# Patient Record
Sex: FEMALE | Race: WHITE | NOT HISPANIC OR LATINO
[De-identification: names, ages, dates, MRNs, and addresses within clinical notes are randomized per-mention and may not be internally consistent; named-entity substitution may affect disease eponyms.]

---

## 2018-04-13 ENCOUNTER — APPOINTMENT (OUTPATIENT)
Dept: ULTRASOUND IMAGING | Facility: IMAGING CENTER | Age: 58
End: 2018-04-13
Payer: COMMERCIAL

## 2018-04-13 ENCOUNTER — APPOINTMENT (OUTPATIENT)
Dept: MAMMOGRAPHY | Facility: IMAGING CENTER | Age: 58
End: 2018-04-13
Payer: COMMERCIAL

## 2018-04-13 ENCOUNTER — OUTPATIENT (OUTPATIENT)
Dept: OUTPATIENT SERVICES | Facility: HOSPITAL | Age: 58
LOS: 1 days | End: 2018-04-13
Payer: COMMERCIAL

## 2018-04-13 DIAGNOSIS — Z00.8 ENCOUNTER FOR OTHER GENERAL EXAMINATION: ICD-10-CM

## 2018-04-13 PROCEDURE — 76641 ULTRASOUND BREAST COMPLETE: CPT

## 2018-04-13 PROCEDURE — 77067 SCR MAMMO BI INCL CAD: CPT | Mod: 26

## 2018-04-13 PROCEDURE — 76641 ULTRASOUND BREAST COMPLETE: CPT | Mod: 26,50

## 2018-04-13 PROCEDURE — 77067 SCR MAMMO BI INCL CAD: CPT

## 2018-04-13 PROCEDURE — 77063 BREAST TOMOSYNTHESIS BI: CPT | Mod: 26

## 2018-04-13 PROCEDURE — 77063 BREAST TOMOSYNTHESIS BI: CPT

## 2019-08-07 ENCOUNTER — APPOINTMENT (OUTPATIENT)
Dept: MAMMOGRAPHY | Facility: IMAGING CENTER | Age: 59
End: 2019-08-07

## 2019-08-07 ENCOUNTER — APPOINTMENT (OUTPATIENT)
Dept: ULTRASOUND IMAGING | Facility: IMAGING CENTER | Age: 59
End: 2019-08-07

## 2021-10-30 ENCOUNTER — EMERGENCY (EMERGENCY)
Facility: HOSPITAL | Age: 61
LOS: 1 days | Discharge: HOME | End: 2021-10-30
Attending: EMERGENCY MEDICINE
Payer: COMMERCIAL

## 2021-10-30 VITALS
RESPIRATION RATE: 19 BRPM | SYSTOLIC BLOOD PRESSURE: 135 MMHG | DIASTOLIC BLOOD PRESSURE: 78 MMHG | HEART RATE: 65 BPM | OXYGEN SATURATION: 99 % | TEMPERATURE: 98 F

## 2021-10-30 VITALS
HEIGHT: 65 IN | HEART RATE: 77 BPM | DIASTOLIC BLOOD PRESSURE: 85 MMHG | RESPIRATION RATE: 20 BRPM | OXYGEN SATURATION: 100 % | TEMPERATURE: 98 F | WEIGHT: 175.05 LBS | SYSTOLIC BLOOD PRESSURE: 148 MMHG

## 2021-10-30 PROCEDURE — 73110 X-RAY EXAM OF WRIST: CPT | Mod: 26,LT

## 2021-10-30 PROCEDURE — 73090 X-RAY EXAM OF FOREARM: CPT | Mod: 26,LT

## 2021-10-30 PROCEDURE — 73090 X-RAY EXAM OF FOREARM: CPT

## 2021-10-30 PROCEDURE — 99285 EMERGENCY DEPT VISIT HI MDM: CPT | Mod: 25

## 2021-10-30 PROCEDURE — 73100 X-RAY EXAM OF WRIST: CPT

## 2021-10-30 PROCEDURE — 99284 EMERGENCY DEPT VISIT MOD MDM: CPT

## 2021-10-30 PROCEDURE — 73100 X-RAY EXAM OF WRIST: CPT | Mod: 26,59,LT

## 2021-10-30 PROCEDURE — 25605 CLTX DST RDL FX/EPHYS SEP W/: CPT | Mod: LT

## 2021-10-30 PROCEDURE — 96372 THER/PROPH/DIAG INJ SC/IM: CPT | Mod: XU

## 2021-10-30 PROCEDURE — 73110 X-RAY EXAM OF WRIST: CPT

## 2021-10-30 RX ORDER — OXYCODONE HYDROCHLORIDE 5 MG/1
5 TABLET ORAL ONCE
Refills: 0 | Status: DISCONTINUED | OUTPATIENT
Start: 2021-10-30 | End: 2021-10-30

## 2021-10-30 RX ORDER — KETOROLAC TROMETHAMINE 30 MG/ML
15 SYRINGE (ML) INJECTION ONCE
Refills: 0 | Status: DISCONTINUED | OUTPATIENT
Start: 2021-10-30 | End: 2021-10-30

## 2021-10-30 RX ORDER — KETOROLAC TROMETHAMINE 30 MG/ML
1 SYRINGE (ML) INJECTION
Qty: 15 | Refills: 0
Start: 2021-10-30 | End: 2021-11-03

## 2021-10-30 RX ORDER — ACETAMINOPHEN 500 MG
975 TABLET ORAL ONCE
Refills: 0 | Status: COMPLETED | OUTPATIENT
Start: 2021-10-30 | End: 2021-10-30

## 2021-10-30 RX ADMIN — Medication 975 MILLIGRAM(S): at 13:02

## 2021-10-30 RX ADMIN — Medication 975 MILLIGRAM(S): at 14:19

## 2021-10-30 RX ADMIN — Medication 15 MILLIGRAM(S): at 14:50

## 2021-10-30 NOTE — ED ADULT NURSE NOTE - NSIMPLEMENTINTERV_GEN_ALL_ED
Implemented All Fall Risk Interventions:  [place] to call system. Call bell, personal items and telephone within reach. Instruct patient to call for assistance. Room bathroom lighting operational. Non-slip footwear when patient is off stretcher. Physically safe environment: no spills, clutter or unnecessary equipment. Stretcher in lowest position, wheels locked, appropriate side rails in place. Provide visual cue, wrist band, yellow gown, etc. Monitor gait and stability. Monitor for mental status changes and reorient to person, place, and time. Review medications for side effects contributing to fall risk. Reinforce activity limits and safety measures with patient and family.

## 2021-10-30 NOTE — ED PROVIDER NOTE - NSFOLLOWUPINSTRUCTIONS_ED_ALL_ED_FT
YOU WERE SEEN IN THE ED FOR: arm fracture    YOUR BROKEN BONE WAS SPLINTED.    YOU WERE PRESCRIBED: toradol (do NOT take motrin/advil/other ibuprofen with this)  FOLLOW THE INSTRUCTIONS ON THE LABEL/CONTAINER    FOR PAIN/FEVER, YOU MAY TAKE TYLENOL (acetaminophen). FOLLOW THE INSTRUCTIONS ON THE LABEL/CONTAINER.    PLEASE FOLLOW UP WITH YOUR PRIVATE PHYSICIAN WITHIN THE NEXT 48 HOURS. BRING COPIES OF YOUR RESULTS.    PLEASE FOLLOW UP WITH ORTHOPEDICS WITHIN 1 WEEK. INFORMATION TO CALL AND MAKE AN APPOINTMENT IS PROVIDED.     RETURN TO THE EMERGENCY DEPARTMENT IF YOU EXPERIENCE ANY NEW/CONCERNING/WORSENING SYMPTOMS.    PLEASE SEE ATTACHED ON COMPARTMENT SYNDROME.

## 2021-10-30 NOTE — ED ADULT NURSE NOTE - OBJECTIVE STATEMENT
61 year old female presents to ED c/o L wrist pain s/p mech fall yesterday with a notable deformity and swelling. (+) radial pulses and patient only able to mobilize 3, 4 and 5th digit. 1st and 2nd digit to L hand is painful to movement. no other complaints or deformities.

## 2021-10-30 NOTE — ED PROVIDER NOTE - PHYSICAL EXAMINATION
GENERAL: middle aged female, sitting in bed, NAD. Vital signs are within normal limits  EYES: Conjunctiva noninjected or pale, sclera anicteric  HENT: NC/AT, moist mucous membranes  NECK: Supple, trachea midline  LUNG: Nonlabored respirations, no wheezes, rales  CV: RRR, Pulses- Radial/dorsalis pedis: 2+ bilateral and equal EXCEPT 1+ left radius (2/2 swelling). 2+ brachial pulse equal, bilateral.  ABDOMEN: Nondistended, nontender  MSK: + volar angulated left forearm deformity, left wrist tenderness. Able to wiggle fingers. Full range of motion at left forearm and shoulder. Unable to flex/extend wrist  SKIN: + left wrist swelling. No rashes  NEURO: AAOx4 (to person, place, time, event), no tremor, steady gait, sensation intact to touch bilateral UE. intact radius/median/ulnar nerve fxn  PSYCH: Normal mood and affect

## 2021-10-30 NOTE — CONSULT NOTE ADULT - SUBJECTIVE AND OBJECTIVE BOX
HPI: 61y year old Female RHD  s/p mechanical fall.  Landed on outstretched R hand after falling over chair.  Patient had deformity and pain in left wrist.  Patient denies pain in any other joint, numbness, tingling, paresthesias. No pain in any other extremities.  No Head trauma or LOC.    PMH:  Asthma    2019 novel coronavirus disease (COVID-19)    COVID-19 vaccine series completed      [ ] No past medical history    PSH:  No significant past surgical history      [ ] No past surgical history    Allergies:  oxycodone (Unknown)      O:  T(C): 36.9 (10-30-21 @ 16:46), Max: 36.9 (10-30-21 @ 16:46)  HR: 65 (10-30-21 @ 16:46) (65 - 77)  BP: 135/78 (10-30-21 @ 16:46) (135/78 - 148/85)  RR: 19 (10-30-21 @ 16:46) (19 - 20)  SpO2: 99% (10-30-21 @ 16:46) (99% - 100%)    Gen  LUE  AIN/PIN/U Intact  SILT M/U/R  Radial pulse palpable, WWP distally  Skin intact  Swollen wrist    Imaging:    Patient underwent hematoma block.  Injected with 10 cc of 1% lidocaine without epinephrine into hematoma.  Hung in traction and reduced. Patient placed in sugartong splint.  Post reduction x-rays reviewed in improved alignment.  A/P 61y year old man with left/right distal radius fracture  Pain Control  HALLIE Graying for comfort  F/u as outpatient in 1 week with Francis      HPI: 61y year old Female RHD  s/p mechanical fall.  Landed on outstretched R hand after falling over chair.  Patient had deformity and pain in left wrist.  Patient denies pain in any other joint, numbness, tingling, paresthesias. No pain in any other extremities.  No Head trauma or LOC.    PMH:  Asthma    2019 novel coronavirus disease (COVID-19)    COVID-19 vaccine series completed      [ ] No past medical history    PSH:  No significant past surgical history      [ ] No past surgical history    Allergies:  oxycodone (Unknown)      O:  T(C): 36.9 (10-30-21 @ 16:46), Max: 36.9 (10-30-21 @ 16:46)  HR: 65 (10-30-21 @ 16:46) (65 - 77)  BP: 135/78 (10-30-21 @ 16:46) (135/78 - 148/85)  RR: 19 (10-30-21 @ 16:46) (19 - 20)  SpO2: 99% (10-30-21 @ 16:46) (99% - 100%)    Gen  LUE  AIN/PIN/U Intact  SILT M/U/R  Radial pulse palpable, WWP distally  Skin intact  Swollen wrist    Secondary:  No TTP over bony landmarks, SILT BL, ROM intact BL, distal pulses palpable.      Imaging:    Patient underwent hematoma block.  Injected with 10 cc of 1% lidocaine without epinephrine into hematoma.  Hung in traction and reduced. Patient placed in sugartong splint.  Post reduction x-rays reviewed in improved alignment.  A/P 61y year old man with left/right distal radius fracture  Pain Control  NWTELMA BURTONE  Sling for comfort  F/u as outpatient in 1 week with Francis

## 2021-10-30 NOTE — ED PROVIDER NOTE - PATIENT PORTAL LINK FT
You can access the FollowMyHealth Patient Portal offered by [facility] by registering at the following website: [email or web address]. By joining [employer]’s FollowMyHealth portal, you will also be able to view your health information using other applications (apps) compatible with our system.

## 2021-10-30 NOTE — ED PROVIDER NOTE - NSICDXPASTMEDICALHX_GEN_ALL_CORE_FT
PAST MEDICAL HISTORY:  2019 novel coronavirus disease (COVID-19)     Asthma     COVID-19 vaccine series completed

## 2021-10-30 NOTE — ED PROVIDER NOTE - CARE PROVIDER_API CALL
Nick Blanco)  Orthopedics  [address] [place] [address]  Phone: [phone]  Fax: [phone]  Follow Up Time: 4-6 Days

## 2021-10-30 NOTE — ED PROVIDER NOTE - CLINICAL SUMMARY MEDICAL DECISION MAKING FREE TEXT BOX
61F, R handed, here with left wrist deformity. neurovac intact. No other signs of traumatic injury. Eval fx vs dislocation. Xrays, analgesia

## 2021-10-30 NOTE — ED PROVIDER NOTE - OBJECTIVE STATEMENT
61F right handed presents to the ED s/p fall off ottkathrin while trying to reach for object high up. Fell onto left hand. + pain, swelling. Able to wiggle fingers. Applied ice. Did not take any analgesia. Denies ac/thinners, LOC, headache, visual changes.

## 2021-10-30 NOTE — ED PROVIDER NOTE - ATTENDING CONTRIBUTION TO CARE
61y female pmh Asthma Covid19 infection, Now fully covid vaccination. Pt comes to ed c/o pain left wrist sp trip and fall backwards. FOOSH, PE WDWN female mild distress heent normocephalic atraumatic neck supple chest clear anterior & posterior cv no rubs, gallops or murmurs abd soft +bs, Neuro no focal defects. MSK salad fork deformity left wrists w ttp   Danny Callaway MD, Facep

## 2021-10-30 NOTE — ED PROVIDER NOTE - PROGRESS NOTE DETAILS
Kurt Singh D.O., PGY3 (Resident)   Patient declined oxycodone. Ice applied to wrist. Elevated Endorsed to Dr Alexsandra Callaway MD, Facep Kurt Singh D.O., PGY3 (Resident)  Discussed /w ortho. Patient splinted. Stable for DC with outpatient f/u. Strict return precautions given with verbal understanding expressed

## 2021-10-31 NOTE — ED POST DISCHARGE NOTE - DETAILS
10/31/21: Patient called and stated that she had a splint placed in the ED yesterday for a wrist fracture. She states that this morning she is feeling like the splint is too tight over her wrist. Patient is complaining of her fingers turning white. I instructed patient to return to the ED to have splint revaluated. Answered all questions and concerns. Estrella Vale PA-C

## 2021-11-01 ENCOUNTER — APPOINTMENT (OUTPATIENT)
Dept: ORTHOPEDIC SURGERY | Facility: CLINIC | Age: 61
End: 2021-11-01
Payer: COMMERCIAL

## 2021-11-01 VITALS
HEART RATE: 75 BPM | SYSTOLIC BLOOD PRESSURE: 130 MMHG | WEIGHT: 177 LBS | OXYGEN SATURATION: 98 % | BODY MASS INDEX: 29.49 KG/M2 | DIASTOLIC BLOOD PRESSURE: 85 MMHG | HEIGHT: 65 IN

## 2021-11-01 PROCEDURE — 99204 OFFICE O/P NEW MOD 45 MIN: CPT | Mod: 25

## 2021-11-01 PROCEDURE — 29075 APPL CST ELBW FNGR SHORT ARM: CPT | Mod: LT

## 2021-11-01 PROCEDURE — 73110 X-RAY EXAM OF WRIST: CPT | Mod: LT

## 2021-11-10 ENCOUNTER — APPOINTMENT (OUTPATIENT)
Dept: ORTHOPEDIC SURGERY | Facility: CLINIC | Age: 61
End: 2021-11-10
Payer: COMMERCIAL

## 2021-11-10 PROCEDURE — 29085 APPL CAST HAND&LWR FOREARM: CPT | Mod: LT

## 2021-11-10 PROCEDURE — 99214 OFFICE O/P EST MOD 30 MIN: CPT | Mod: 25

## 2021-11-10 PROCEDURE — 73110 X-RAY EXAM OF WRIST: CPT | Mod: LT

## 2021-11-19 ENCOUNTER — APPOINTMENT (OUTPATIENT)
Dept: ORTHOPEDIC SURGERY | Facility: CLINIC | Age: 61
End: 2021-11-19
Payer: COMMERCIAL

## 2021-11-19 PROCEDURE — 73110 X-RAY EXAM OF WRIST: CPT | Mod: LT

## 2021-11-19 PROCEDURE — 29075 APPL CST ELBW FNGR SHORT ARM: CPT | Mod: LT

## 2021-11-19 PROCEDURE — 99214 OFFICE O/P EST MOD 30 MIN: CPT | Mod: 25

## 2021-11-22 ENCOUNTER — APPOINTMENT (OUTPATIENT)
Dept: ORTHOPEDIC SURGERY | Facility: CLINIC | Age: 61
End: 2021-11-22
Payer: COMMERCIAL

## 2021-11-22 PROCEDURE — 99213 OFFICE O/P EST LOW 20 MIN: CPT | Mod: 25

## 2021-11-29 ENCOUNTER — OUTPATIENT (OUTPATIENT)
Dept: OUTPATIENT SERVICES | Facility: HOSPITAL | Age: 61
LOS: 1 days | End: 2021-11-29
Payer: COMMERCIAL

## 2021-11-29 VITALS
RESPIRATION RATE: 16 BRPM | HEART RATE: 76 BPM | TEMPERATURE: 98 F | SYSTOLIC BLOOD PRESSURE: 124 MMHG | OXYGEN SATURATION: 100 % | WEIGHT: 173.94 LBS | DIASTOLIC BLOOD PRESSURE: 72 MMHG | HEIGHT: 65 IN

## 2021-11-29 DIAGNOSIS — Z98.891 HISTORY OF UTERINE SCAR FROM PREVIOUS SURGERY: Chronic | ICD-10-CM

## 2021-11-29 DIAGNOSIS — Z90.49 ACQUIRED ABSENCE OF OTHER SPECIFIED PARTS OF DIGESTIVE TRACT: Chronic | ICD-10-CM

## 2021-11-29 DIAGNOSIS — Z01.818 ENCOUNTER FOR OTHER PREPROCEDURAL EXAMINATION: ICD-10-CM

## 2021-11-29 DIAGNOSIS — S52.502A UNSPECIFIED FRACTURE OF THE LOWER END OF LEFT RADIUS, INITIAL ENCOUNTER FOR CLOSED FRACTURE: ICD-10-CM

## 2021-11-29 DIAGNOSIS — S52.572B OTHER INTRAARTICULAR FRACTURE OF LOWER END OF LEFT RADIUS, INITIAL ENCOUNTER FOR OPEN FRACTURE TYPE I OR II: ICD-10-CM

## 2021-11-29 LAB
ANION GAP SERPL CALC-SCNC: 16 MMOL/L — SIGNIFICANT CHANGE UP (ref 5–17)
BUN SERPL-MCNC: 12 MG/DL — SIGNIFICANT CHANGE UP (ref 7–23)
CALCIUM SERPL-MCNC: 10 MG/DL — SIGNIFICANT CHANGE UP (ref 8.4–10.5)
CHLORIDE SERPL-SCNC: 105 MMOL/L — SIGNIFICANT CHANGE UP (ref 96–108)
CO2 SERPL-SCNC: 22 MMOL/L — SIGNIFICANT CHANGE UP (ref 22–31)
CREAT SERPL-MCNC: 0.69 MG/DL — SIGNIFICANT CHANGE UP (ref 0.5–1.3)
GLUCOSE SERPL-MCNC: 79 MG/DL — SIGNIFICANT CHANGE UP (ref 70–99)
HCT VFR BLD CALC: 40.9 % — SIGNIFICANT CHANGE UP (ref 34.5–45)
HGB BLD-MCNC: 13.4 G/DL — SIGNIFICANT CHANGE UP (ref 11.5–15.5)
MCHC RBC-ENTMCNC: 32 PG — SIGNIFICANT CHANGE UP (ref 27–34)
MCHC RBC-ENTMCNC: 32.8 GM/DL — SIGNIFICANT CHANGE UP (ref 32–36)
MCV RBC AUTO: 97.6 FL — SIGNIFICANT CHANGE UP (ref 80–100)
NRBC # BLD: 0 /100 WBCS — SIGNIFICANT CHANGE UP (ref 0–0)
PLATELET # BLD AUTO: 303 K/UL — SIGNIFICANT CHANGE UP (ref 150–400)
POTASSIUM SERPL-MCNC: 3.6 MMOL/L — SIGNIFICANT CHANGE UP (ref 3.5–5.3)
POTASSIUM SERPL-SCNC: 3.6 MMOL/L — SIGNIFICANT CHANGE UP (ref 3.5–5.3)
RBC # BLD: 4.19 M/UL — SIGNIFICANT CHANGE UP (ref 3.8–5.2)
RBC # FLD: 13.1 % — SIGNIFICANT CHANGE UP (ref 10.3–14.5)
SODIUM SERPL-SCNC: 143 MMOL/L — SIGNIFICANT CHANGE UP (ref 135–145)
WBC # BLD: 5.96 K/UL — SIGNIFICANT CHANGE UP (ref 3.8–10.5)
WBC # FLD AUTO: 5.96 K/UL — SIGNIFICANT CHANGE UP (ref 3.8–10.5)

## 2021-11-29 PROCEDURE — 80048 BASIC METABOLIC PNL TOTAL CA: CPT

## 2021-11-29 PROCEDURE — 36415 COLL VENOUS BLD VENIPUNCTURE: CPT

## 2021-11-29 PROCEDURE — G0463: CPT

## 2021-11-29 PROCEDURE — 85027 COMPLETE CBC AUTOMATED: CPT

## 2021-11-29 RX ORDER — SODIUM CHLORIDE 9 MG/ML
3 INJECTION INTRAMUSCULAR; INTRAVENOUS; SUBCUTANEOUS EVERY 8 HOURS
Refills: 0 | Status: DISCONTINUED | OUTPATIENT
Start: 2021-12-02 | End: 2021-12-17

## 2021-11-29 RX ORDER — LIDOCAINE HCL 20 MG/ML
0.2 VIAL (ML) INJECTION ONCE
Refills: 0 | Status: DISCONTINUED | OUTPATIENT
Start: 2021-12-02 | End: 2021-12-17

## 2021-11-29 NOTE — H&P PST ADULT - NSANTHOSAYNRD_GEN_A_CORE
No. THI screening performed.  STOP BANG Legend: 0-2 = LOW Risk; 3-4 = INTERMEDIATE Risk; 5-8 = HIGH Risk

## 2021-11-29 NOTE — H&P PST ADULT - OTHER CARE PROVIDERS
Dr. Pena [phone] (cardio), Dr. ANTONIO Jennings [phone] and Dr. MARIFER Lechuga [phone] - both pulm

## 2021-11-29 NOTE — H&P PST ADULT - HISTORY OF PRESENT ILLNESS
61 y.o. female with h/o hypothyroid, mild asthma c/o pain and limited ROM Left wrist due to comminuted Left distal radius fracture s/p fall backwards and onto an ottoman while hanging curtains on 10/30/21. Patient had conservative treatment initially, but now has opted for surgical intervention. She is scheduled for Left Distal Radius Open Reduction Internal Fixation on 12/02/21.     Covid test on 11/29/21.

## 2021-12-01 ENCOUNTER — TRANSCRIPTION ENCOUNTER (OUTPATIENT)
Age: 61
End: 2021-12-01

## 2021-12-01 PROBLEM — I34.1 NONRHEUMATIC MITRAL (VALVE) PROLAPSE: Chronic | Status: ACTIVE | Noted: 2021-11-29

## 2021-12-01 PROBLEM — U07.1 COVID-19: Chronic | Status: ACTIVE | Noted: 2021-10-30

## 2021-12-01 PROBLEM — J45.909 UNSPECIFIED ASTHMA, UNCOMPLICATED: Chronic | Status: ACTIVE | Noted: 2021-10-30

## 2021-12-01 PROBLEM — S52.502A UNSPECIFIED FRACTURE OF THE LOWER END OF LEFT RADIUS, INITIAL ENCOUNTER FOR CLOSED FRACTURE: Chronic | Status: ACTIVE | Noted: 2021-11-29

## 2021-12-02 ENCOUNTER — APPOINTMENT (OUTPATIENT)
Dept: ORTHOPEDIC SURGERY | Facility: HOSPITAL | Age: 61
End: 2021-12-02

## 2021-12-02 ENCOUNTER — OUTPATIENT (OUTPATIENT)
Dept: OUTPATIENT SERVICES | Facility: HOSPITAL | Age: 61
LOS: 1 days | End: 2021-12-02
Payer: COMMERCIAL

## 2021-12-02 VITALS
DIASTOLIC BLOOD PRESSURE: 64 MMHG | SYSTOLIC BLOOD PRESSURE: 114 MMHG | OXYGEN SATURATION: 100 % | HEART RATE: 64 BPM | RESPIRATION RATE: 16 BRPM

## 2021-12-02 VITALS
TEMPERATURE: 98 F | HEART RATE: 79 BPM | DIASTOLIC BLOOD PRESSURE: 86 MMHG | OXYGEN SATURATION: 97 % | SYSTOLIC BLOOD PRESSURE: 126 MMHG | RESPIRATION RATE: 16 BRPM | HEIGHT: 65 IN | WEIGHT: 173.94 LBS

## 2021-12-02 DIAGNOSIS — Z90.49 ACQUIRED ABSENCE OF OTHER SPECIFIED PARTS OF DIGESTIVE TRACT: Chronic | ICD-10-CM

## 2021-12-02 DIAGNOSIS — Z98.891 HISTORY OF UTERINE SCAR FROM PREVIOUS SURGERY: Chronic | ICD-10-CM

## 2021-12-02 DIAGNOSIS — S52.572B OTHER INTRAARTICULAR FRACTURE OF LOWER END OF LEFT RADIUS, INITIAL ENCOUNTER FOR OPEN FRACTURE TYPE I OR II: ICD-10-CM

## 2021-12-02 PROCEDURE — 25609 OPTX DST RD XART FX/EP SEP3+: CPT | Mod: LT

## 2021-12-02 PROCEDURE — C1713: CPT

## 2021-12-02 PROCEDURE — 25290 INCISE WRIST/FOREARM TENDON: CPT | Mod: LT

## 2021-12-02 RX ORDER — VANCOMYCIN HCL 1 G
1000 VIAL (EA) INTRAVENOUS ONCE
Refills: 0 | Status: COMPLETED | OUTPATIENT
Start: 2021-12-02 | End: 2021-12-02

## 2021-12-02 RX ORDER — APREPITANT 80 MG/1
40 CAPSULE ORAL ONCE
Refills: 0 | Status: COMPLETED | OUTPATIENT
Start: 2021-12-02 | End: 2021-12-02

## 2021-12-02 RX ORDER — THYROID 120 MG
150 TABLET ORAL
Qty: 0 | Refills: 0 | DISCHARGE

## 2021-12-02 RX ORDER — TRAMADOL HYDROCHLORIDE 50 MG/1
1 TABLET ORAL
Qty: 30 | Refills: 0
Start: 2021-12-02 | End: 2021-12-06

## 2021-12-02 RX ORDER — ONDANSETRON 8 MG/1
4 TABLET, FILM COATED ORAL ONCE
Refills: 0 | Status: COMPLETED | OUTPATIENT
Start: 2021-12-02 | End: 2021-12-02

## 2021-12-02 RX ORDER — IBUPROFEN 200 MG
400 TABLET ORAL ONCE
Refills: 0 | Status: COMPLETED | OUTPATIENT
Start: 2021-12-02 | End: 2021-12-02

## 2021-12-02 RX ORDER — CHLORHEXIDINE GLUCONATE 213 G/1000ML
1 SOLUTION TOPICAL ONCE
Refills: 0 | Status: COMPLETED | OUTPATIENT
Start: 2021-12-02 | End: 2021-12-02

## 2021-12-02 RX ORDER — BUDESONIDE AND FORMOTEROL FUMARATE DIHYDRATE 160; 4.5 UG/1; UG/1
2 AEROSOL RESPIRATORY (INHALATION)
Qty: 0 | Refills: 0 | DISCHARGE

## 2021-12-02 RX ADMIN — APREPITANT 40 MILLIGRAM(S): 80 CAPSULE ORAL at 12:49

## 2021-12-02 RX ADMIN — ONDANSETRON 4 MILLIGRAM(S): 8 TABLET, FILM COATED ORAL at 18:06

## 2021-12-02 RX ADMIN — Medication 400 MILLIGRAM(S): at 18:07

## 2021-12-02 RX ADMIN — Medication 400 MILLIGRAM(S): at 18:40

## 2021-12-02 NOTE — PRE-ANESTHESIA EVALUATION ADULT - NSANTHADDINFOFT_GEN_ALL_CORE
asthma: well controlled, never intubated nor hospitalized  significant post-op N/V after  under general anesthesia    extensive discussion of risk/benefits of block w/ sedation vs. general anesthesia; patient consented to block

## 2021-12-02 NOTE — ASU PATIENT PROFILE, ADULT - FALL HARM RISK - UNIVERSAL INTERVENTIONS
Bed in lowest position, wheels locked, appropriate side rails in place/Call bell, personal items and telephone in reach/Instruct patient to call for assistance before getting out of bed or chair/Non-slip footwear when patient is out of bed/[place] to call system/Physically safe environment - no spills, clutter or unnecessary equipment/Purposeful Proactive Rounding/Room/bathroom lighting operational, light cord in reach

## 2021-12-02 NOTE — BRIEF OPERATIVE NOTE - NSICDXBRIEFPROCEDURE_GEN_ALL_CORE_FT
PROCEDURES:  Open reduction and internal fixation of fracture of left distal radius and ulna 02-Dec-2021 17:05:43  Keith Estes

## 2021-12-02 NOTE — ASU DISCHARGE PLAN (ADULT/PEDIATRIC) - NS MD DC FALL RISK RISK
For information on Fall & Injury Prevention, visit: [email or web address] OR  [email or web address] OR  https://www.cdc.gov/steadi/patient.html

## 2021-12-02 NOTE — ASU PATIENT PROFILE, ADULT - NSICDXPASTMEDICALHX_GEN_ALL_CORE_FT
PAST MEDICAL HISTORY:  2019 novel coronavirus disease (COVID-19) - 4 days in the hospital, 1 day on O2 (pneumonia), 03/2021, no residuals    Asthma - mild, no hospitalizations    Distal radius fracture, left 10/30/21    MVP (mitral valve prolapse) - mild

## 2021-12-02 NOTE — ASU DISCHARGE PLAN (ADULT/PEDIATRIC) - CARE PROVIDER_API CALL
Lesly Lee)  [address], [facility] [address]  [place] [address]  Phone: [phone]  Fax: [phone]  Follow Up Time: 1 week

## 2021-12-03 ENCOUNTER — APPOINTMENT (OUTPATIENT)
Dept: ORTHOPEDIC SURGERY | Facility: CLINIC | Age: 61
End: 2021-12-03
Payer: COMMERCIAL

## 2021-12-03 PROCEDURE — 96372 THER/PROPH/DIAG INJ SC/IM: CPT | Mod: LT,58

## 2021-12-03 PROCEDURE — 99024 POSTOP FOLLOW-UP VISIT: CPT

## 2021-12-03 RX ORDER — BUDESONIDE AND FORMOTEROL FUMARATE DIHYDRATE 160; 4.5 UG/1; UG/1
160-4.5 AEROSOL RESPIRATORY (INHALATION)
Qty: 10 | Refills: 0 | Status: ACTIVE | COMMUNITY
Start: 2021-08-05

## 2021-12-03 RX ORDER — THYROID, PORCINE 120 MG/1
120 TABLET ORAL
Qty: 30 | Refills: 0 | Status: ACTIVE | COMMUNITY
Start: 2020-11-20

## 2021-12-10 ENCOUNTER — APPOINTMENT (OUTPATIENT)
Dept: ORTHOPEDIC SURGERY | Facility: CLINIC | Age: 61
End: 2021-12-10
Payer: COMMERCIAL

## 2021-12-10 PROCEDURE — 99024 POSTOP FOLLOW-UP VISIT: CPT

## 2021-12-10 PROCEDURE — 73110 X-RAY EXAM OF WRIST: CPT | Mod: LT

## 2021-12-17 ENCOUNTER — APPOINTMENT (OUTPATIENT)
Dept: ORTHOPEDIC SURGERY | Facility: CLINIC | Age: 61
End: 2021-12-17
Payer: COMMERCIAL

## 2021-12-17 PROCEDURE — 99024 POSTOP FOLLOW-UP VISIT: CPT

## 2021-12-29 ENCOUNTER — APPOINTMENT (OUTPATIENT)
Dept: ORTHOPEDIC SURGERY | Facility: CLINIC | Age: 61
End: 2021-12-29

## 2022-02-04 ENCOUNTER — APPOINTMENT (OUTPATIENT)
Dept: ORTHOPEDIC SURGERY | Facility: CLINIC | Age: 62
End: 2022-02-04
Payer: COMMERCIAL

## 2022-02-04 PROCEDURE — 99024 POSTOP FOLLOW-UP VISIT: CPT

## 2022-02-04 PROCEDURE — 73110 X-RAY EXAM OF WRIST: CPT | Mod: LT

## 2022-03-04 ENCOUNTER — APPOINTMENT (OUTPATIENT)
Dept: ORTHOPEDIC SURGERY | Facility: CLINIC | Age: 62
End: 2022-03-04
Payer: COMMERCIAL

## 2022-03-04 DIAGNOSIS — S52.572D OTHER INTRAARTICULAR FRACTURE OF LOWER END OF LEFT RADIUS, SUBSEQUENT ENCOUNTER FOR CLOSED FRACTURE WITH ROUTINE HEALING: ICD-10-CM

## 2022-03-04 PROCEDURE — 73110 X-RAY EXAM OF WRIST: CPT | Mod: LT

## 2022-03-04 PROCEDURE — 99214 OFFICE O/P EST MOD 30 MIN: CPT

## 2022-04-08 ENCOUNTER — APPOINTMENT (OUTPATIENT)
Dept: ORTHOPEDIC SURGERY | Facility: CLINIC | Age: 62
End: 2022-04-08

## 2022-06-30 ENCOUNTER — APPOINTMENT (OUTPATIENT)
Dept: UROLOGY | Facility: CLINIC | Age: 62
End: 2022-06-30

## 2022-06-30 VITALS
DIASTOLIC BLOOD PRESSURE: 82 MMHG | WEIGHT: 180 LBS | BODY MASS INDEX: 29.99 KG/M2 | SYSTOLIC BLOOD PRESSURE: 133 MMHG | HEART RATE: 69 BPM | HEIGHT: 65 IN

## 2022-06-30 DIAGNOSIS — Z78.9 OTHER SPECIFIED HEALTH STATUS: ICD-10-CM

## 2022-06-30 DIAGNOSIS — Z80.0 FAMILY HISTORY OF MALIGNANT NEOPLASM OF DIGESTIVE ORGANS: ICD-10-CM

## 2022-06-30 PROCEDURE — 99203 OFFICE O/P NEW LOW 30 MIN: CPT

## 2022-06-30 NOTE — LETTER BODY
[FreeTextEntry1] : Kyree Castillo MD \par [address] [facility]\[place] [address]\par [phone]\par \par Dear Dr. Castillo, \par \par Reason for Visit: Pelvic prolapse. \par \par This is a 62 year-old woman with pelvic prolapse. The patient is referred for evaluation of her condition. She reports she has mild pelvic prolapse found during recent gynecologic examination. The patient expresses concern about her pelvic prolapse. She denies any urinary complaints or discomfort. The patient denies any gross hematuria or dysuria or urinary incontinence. She denies any aggravating or relieving factors. She denies any interference of function. The patient is entirely asymptomatic. All other review of systems are negative. She has esophageal  cancer in her family medical history. She has history of  section and cholecystectomy. Past medical history, family history and social history were inquired and were noncontributory to current condition. The patient does not use tobacco or drink alcohol. Medications and allergies were reviewed. She has no known allergies to medication. \par \par On examination, the patient is a healthy-appearing woman in no acute distress. She is alert and oriented and follows commands. She  has normal mood and affect. She is normocephalic. Oral no thrush. Neck is supple. Respirations are unlabored. Abdomen is soft and nontender. Liver is nonpalpable. Bladder is nonpalpable. No CVA tenderness. Neurologically she is grossly intact. No peripheral edema. Skin without gross abnormality. She has a 2+ cystocele with pelvic prolapse. \par \par Assessment: Pelvic prolapse. Cystocele. \par \par I counseled the patient. I discussed the various etiologies of her symptoms. Her physical examination demonstrated a 2+ cystocele with pelvic prolapse. Given that the patient is currently asymptomatic, I recommended she proceed with conservative management. The patient understands that if she develops gross hematuria or any urinary discomfort, she will contact me for further evaluation. Risks and alternatives were discussed. I answered the patient's questions. The patient will follow-up as directed and will contact me with any questions or concerns. Thank you for the opportunity to participate in the care of Ms. VILLALOBOS. I will keep you updated on her progress.\par \par Plan: Conservative management. Follow up as needed.

## 2022-06-30 NOTE — ADDENDUM
[FreeTextEntry1] : Entered by Davon Montes, acting as scribe for Dr. Clifton Almanzar.\par \par The documentation recorded by the scribe accurately reflects the service I personally performed and the decisions made by me.

## 2022-09-14 NOTE — ASU DISCHARGE PLAN (ADULT/PEDIATRIC) - NPI NUMBER (FOR SYSADMIN USE ONLY) :
Relevant Problems   RESPIRATORY SYSTEM   (+) COPD (chronic obstructive pulmonary disease) (HCC)      NEUROLOGY   (+) Alzheimer's disease, unspecified (HCC)      CARDIOVASCULAR   (+) Essential hypertension      ENDOCRINE   (+) Diabetes mellitus (HCC)   (+) Type 2 diabetes with nephropathy (HCC)      HEMATOLOGY   (+) Anemia of unknown etiology       Anesthetic History   No history of anesthetic complications            Review of Systems / Medical History  Patient summary reviewed and pertinent labs reviewed    Pulmonary    COPD: moderate      Shortness of breath         Neuro/Psych         Dementia     Cardiovascular    Hypertension          CAD, cardiac stents and CABG      Comments: Plavix stopped due to Anemia  Multiple stents  No recent cardiac problems; denies CP or SOB   GI/Hepatic/Renal         Renal disease: CRI       Endo/Other    Diabetes    Anemia     Other Findings            Physical Exam    Airway  Mallampati: III    Neck ROM: normal range of motion   Mouth opening: Normal     Cardiovascular    Rhythm: regular  Rate: normal         Dental  No notable dental hx       Pulmonary  Breath sounds clear to auscultation               Abdominal  GI exam deferred       Other Findings            Anesthetic Plan    ASA: 3  Anesthesia type: MAC          Induction: Intravenous  Anesthetic plan and risks discussed with: Patient and Spouse
[[id number]]

## 2022-11-15 ENCOUNTER — APPOINTMENT (OUTPATIENT)
Dept: ORTHOPEDIC SURGERY | Facility: CLINIC | Age: 62
End: 2022-11-15

## 2022-11-15 VITALS
HEIGHT: 65 IN | BODY MASS INDEX: 29.99 KG/M2 | DIASTOLIC BLOOD PRESSURE: 85 MMHG | WEIGHT: 180 LBS | SYSTOLIC BLOOD PRESSURE: 132 MMHG

## 2022-11-15 DIAGNOSIS — M76.899 OTHER SPECIFIED ENTHESOPATHIES OF UNSPECIFIED LOWER LIMB, EXCLUDING FOOT: ICD-10-CM

## 2022-11-15 DIAGNOSIS — M25.561 PAIN IN RIGHT KNEE: ICD-10-CM

## 2022-11-15 DIAGNOSIS — M25.562 PAIN IN RIGHT KNEE: ICD-10-CM

## 2022-11-15 DIAGNOSIS — M54.16 RADICULOPATHY, LUMBAR REGION: ICD-10-CM

## 2022-11-15 PROCEDURE — 73564 X-RAY EXAM KNEE 4 OR MORE: CPT | Mod: 50

## 2022-11-15 PROCEDURE — 99213 OFFICE O/P EST LOW 20 MIN: CPT

## 2022-11-15 PROCEDURE — 72170 X-RAY EXAM OF PELVIS: CPT

## 2022-11-17 PROBLEM — M76.899 KNEE TENDINITIS: Status: ACTIVE | Noted: 2022-11-17

## 2022-11-17 NOTE — REVIEW OF SYSTEMS
[Joint Pain] : joint pain [Negative] : Endocrine [Joint Stiffness] : no joint stiffness [Joint Swelling] : no joint swelling [Fever] : no fever [Chills] : no chills [FreeTextEntry7] : History of Gastritis [de-identified] : No fevers/chills [de-identified] : No numbness/tingling

## 2022-11-17 NOTE — PHYSICAL EXAM
[de-identified] : Constitutional:  62 year old female, alert and oriented, cooperative, in no acute distress.\par \par HEENT \par NC/AT.  Appearance: symmetric\par \par Neck/Back\par Straight without deformity or instability.  Good ROM.\par \par Chest/Respiratory \par Respiratory effort: no intercostal retractions or use of accessory muscles. Nonlabored Breathing\par \par Skin \par On inspection, warm and dry without rashes or lesions.\par \par Mental Status: \par Judgment, insight: intact\par Orientation: oriented to time, place, and person\par \par Neurological:\par Sensory and Motor are grossly intact throughout\par \par Left Knee\par \par Inspection:\par     Skin intact, no rashes or lesions\par     No Effusion\par     Non-tender to palpation over tibial tubercle, patella, medial and lateral joint line, and pes insertion.\par \par Range of Motion:\par 	Extension - 0 degrees\par 	Flexion - 120 degrees\par 	Extensor lag: None\par \par Stability:\par      Demonstrates no Varus or Valgus instability\par      Negative Anterior or Posterior drawer.\par      Negative Lachman's\par \par Patella: stable, tracks well. \par \par Right Knee\par \par Inspection:\par     Skin intact, no rashes or lesions\par     No Effusion\par     Non-tender to palpation over tibial tubercle and patella\par     Tenderness over the medial and lateral joint lines.\par     Tenderness over the pes insertion.\par \par Range of Motion:\par 	Extension - 0 degrees\par 	Flexion - 120 degrees\par 	Extensor lag: None\par \par Stability:\par      Demonstrates no Varus or Valgus instability\par      Negative Anterior or Posterior drawer.\par      Negative Lachman's\par \par Patella: stable, tracks well. \par \par Neurologic Exam\par     Motor intact including 5/5 Extensor Hallucis Longus, 5/5 Flexor Hallucis Longus, 5/5 Tibialis Anterior and 5/5 Gastrocnemius\par     Sensation Intact to Light Touch including Saphenous, Sural, Superficial Peroneal, Deep Peroneal, Tibial nerve distributions\par \par Vascular Exam\par     Foot is warm and well perfused with 2+ Dorsalis Pedis Pulse \par \par No pain with range of motion of the bilateral hips. No lumbar paraspinal muscle tenderness.  [de-identified] : XRay:  XRays of the Pelvis (1 View) taken in the office today and discussed with the patient. XRays demonstrate no obvious fracture or dislocation. There is joint space narrowing in the bilateral hips. (my personal interpretation). \par \par XRay: XRays of the Right Knee (4 Views) taken in the office today and reviewed with the patient. XRays demonstrate joint space narrowing in the medial compartment, consistent with osteoarthritis, KL Grade: 2. (my personal interpretation) \par \par XRay: XRays of the Left Knee (4 Views) taken in the office today and reviewed with the patient. XRays demonstrate joint space narrowing in the medial compartment, consistent with osteoarthritis, KL Grade: 2. (my personal interpretation)

## 2022-11-17 NOTE — DISCUSSION/SUMMARY
[de-identified] : Ms. Saini is a 62-year-old female presented to the office for evaluation of bilateral knee pain.  Patient first began to feel sharp pain in the right knee on 11/10/2022.  Pain is worse with activity and ambulation.  Patient states that over the last few days, her knee pain has improved somewhat.  XRays showed bilateral knee osteoarthritis.  Examination showed tenderness over the right pes anserinus and medial lateral joint lines.  Discussed with the patient examination and imaging findings.  Discussed with the patient the potential etiologies of her knee pain including, but not limited to, pes anserinus tendinitis, knee sprain, knee strain, and knee osteoarthritis.  Discussed with the patient the management of her knee pain, including physical therapy and anti-inflammatories.  Due to patient's history of gastritis, will defer anti-inflammatories at this time.  Patient was given a referral to physical therapy.  She was given a hinged knee brace.  Patient would also like a referral to a spine specialist for her chronic pain that radiates into her feet.  She was given a referral to Dr. Stack.  Patient will follow-up in 8 weeks for reevaluation and management.  Patient understanding and agreement with the plan.  All questions answered.\par \par Plan:\par - Physical therapy for bilateral knee pain\par - Follow up with Dr. Stack for possible lumbar radiculopathy\par - Follow up in 8 weeks for reevaluation and management of knee pain

## 2022-11-17 NOTE — HISTORY OF PRESENT ILLNESS
[de-identified] : Ms. Saini is a 62-year-old female who presented to the office for evaluation of her bilateral knee pain.  Patient has a history of bilateral knee pain, with some occasional left knee pain but she primarily has pain in the right knee.  She states that she had a sharp pain starting on 11/10/2022.  She states that the pain was not consistent, but would be worse with walking and she would have intermittent sharp pains.  She had some posterior knee pain as well.  Patient has tried ice with good relief of pain and rest of the knee for about 3 days.  Patient states that she has some pain today, but is not the sharp pain and overall her pain is improving.  She states that she does have some pain that radiates from the knee to the foot and some tingling in her foot.  No recent trauma.  No fevers or chills.

## 2023-01-17 ENCOUNTER — APPOINTMENT (OUTPATIENT)
Dept: ORTHOPEDIC SURGERY | Facility: CLINIC | Age: 63
End: 2023-01-17

## 2023-05-05 ENCOUNTER — APPOINTMENT (OUTPATIENT)
Dept: UROLOGY | Facility: CLINIC | Age: 63
End: 2023-05-05

## 2023-05-08 ENCOUNTER — APPOINTMENT (OUTPATIENT)
Dept: UROLOGY | Facility: CLINIC | Age: 63
End: 2023-05-08
Payer: COMMERCIAL

## 2023-05-08 VITALS
RESPIRATION RATE: 17 BRPM | HEIGHT: 65 IN | WEIGHT: 170 LBS | TEMPERATURE: 98.1 F | BODY MASS INDEX: 28.32 KG/M2 | HEART RATE: 78 BPM | SYSTOLIC BLOOD PRESSURE: 124 MMHG | DIASTOLIC BLOOD PRESSURE: 82 MMHG

## 2023-05-08 DIAGNOSIS — N81.4 UTEROVAGINAL PROLAPSE, UNSPECIFIED: ICD-10-CM

## 2023-05-08 PROCEDURE — 99215 OFFICE O/P EST HI 40 MIN: CPT | Mod: 25

## 2023-05-08 PROCEDURE — 51798 US URINE CAPACITY MEASURE: CPT

## 2023-05-08 NOTE — HISTORY OF PRESENT ILLNESS
[FreeTextEntry1] : 64 yo female is a new patient to me. She initially saw Dr. Almanzar about 1 year ago for bladder prolapse.  She reports her prolapse is visible at times. \par \par Noticed her bladder prolapse a little over 1 year ago. Would like to see if her prolapse needs intervention\par Has talked to her OBGYN about this and was recommended to see a Urologist - Dr Donna Dejesus \par \par No daytime urinary frequency\par No Nocturia\par No stress urinary incontinence\par rare UUI\par \par \par bowel movements daily, no straining, no constipation \par \par Social Hx:\par Never smoker\par Has 2 kids; 1 delivered vaginally, 1 delivered via \par Stands for long periods of time at work throughout the day and feels the prolapse the most during this time\par \par Sexually active, no pain with intercourse\par \par PMHx:\par Asthmatic\par No diabetes\par no cardiac hx\par no aticoagulation\par \par PSHx:\par lap clara\par c section\par L wrist\par \par \par PVR today is 40 mL\par

## 2023-05-08 NOTE — END OF VISIT
[] : Fellow [FreeTextEntry3] : Fellow - Dr Ethel Stroud \par \par We discussed the etiology and management of pelvic organ prolapse.\par \par We discussed conservative non-operative interventions including weight loss, pelvic floor PT, and use of a pessary device.\par \par We discussed surgical interventions, including vaginal and abdominal approaches.  We discussed the r/b/a of obliterative vaginal surgery, or colpocleisis.  We also discussed vaginal native tissue prolapse repair. We also discussed abdominal sacrocolpopexy with mesh.\par \par \par PLAN\par rto for pessary fitting\par \par The total time spent with the patient includes face to face time as well as time for documentation, ordering medications/labs/procedures, and care coordination, but I acknowledge it does not include time spent on any procedures performed (eg PVR, UDS, Cystoscopy, catheter changes, etc).  Time includes reviewing the chart prior to visit, documentation, and correspondence.\par  [FreeTextEntry2] : The patient voided with instructions to empty their bladder. Afterwards, we performed a bladder ultrasound scan to obtain a post-void residual.  The estimated residual volume on the bladder scan was:  40 cc \par  [Time Spent: ___ minutes] : I have spent [unfilled] minutes of time on the encounter.

## 2023-05-08 NOTE — PHYSICAL EXAM
[General Appearance - Well Developed] : well developed [General Appearance - Well Nourished] : well nourished [Normal Appearance] : normal appearance [Well Groomed] : well groomed [General Appearance - In No Acute Distress] : no acute distress [Edema] : no peripheral edema [] : no respiratory distress [Exaggerated Use Of Accessory Muscles For Inspiration] : no accessory muscle use [Normal Station and Gait] : the gait and station were normal for the patient's age [Affect] : the affect was normal [Mood] : the mood was normal [Not Anxious] : not anxious [Abdomen Soft] : soft [Abdomen Tenderness] : non-tender [Urethral Meatus] : the meatus of the urethra showed no abnormalities [External Female Genitalia] : normal external genitalia [FreeTextEntry1] : Stage 3 cystocele with apical descent  [Skin Color & Pigmentation] : normal skin color and pigmentation

## 2023-05-23 ENCOUNTER — OUTPATIENT (OUTPATIENT)
Dept: OUTPATIENT SERVICES | Facility: HOSPITAL | Age: 63
LOS: 1 days | End: 2023-05-23
Payer: COMMERCIAL

## 2023-05-23 ENCOUNTER — APPOINTMENT (OUTPATIENT)
Dept: UROLOGY | Facility: CLINIC | Age: 63
End: 2023-05-23
Payer: COMMERCIAL

## 2023-05-23 VITALS
DIASTOLIC BLOOD PRESSURE: 68 MMHG | HEIGHT: 65 IN | SYSTOLIC BLOOD PRESSURE: 106 MMHG | WEIGHT: 172 LBS | OXYGEN SATURATION: 100 % | BODY MASS INDEX: 28.66 KG/M2 | TEMPERATURE: 98 F | HEART RATE: 80 BPM

## 2023-05-23 DIAGNOSIS — Z90.49 ACQUIRED ABSENCE OF OTHER SPECIFIED PARTS OF DIGESTIVE TRACT: Chronic | ICD-10-CM

## 2023-05-23 DIAGNOSIS — Z98.891 HISTORY OF UTERINE SCAR FROM PREVIOUS SURGERY: Chronic | ICD-10-CM

## 2023-05-23 PROCEDURE — 57160 INSERT PESSARY/OTHER DEVICE: CPT

## 2023-05-23 NOTE — PHYSICAL EXAM
[General Appearance - Well Developed] : well developed [Normal Appearance] : normal appearance [Abdomen Soft] : soft [External Female Genitalia] : normal external genitalia [Edema] : no peripheral edema [] : no respiratory distress [Not Anxious] : not anxious [Normal Station and Gait] : the gait and station were normal for the patient's age [FreeTextEntry1] : stage 3 cystocele

## 2023-05-23 NOTE — HISTORY OF PRESENT ILLNESS
[FreeTextEntry1] : 63 yr old female patient with hx of prolapse presents to office today for pessary fitting. pt had never had pessary before, currently would like to proceed with conservative interventions. \par \par cystocele is mostly visible after prolong standing\par \par denies urine incontinence \par denies constipation \par currently sexually active \par \par PVR from 5/8 - 40cc

## 2023-05-23 NOTE — ASSESSMENT
[FreeTextEntry1] : Pt fitted with pessary size 2-4, most comfortable with size #3. sent home with pessary with support with #3 \par \par Pt advised to call office with any concerns or questions\par \par RTO 1 month

## 2023-05-25 DIAGNOSIS — N81.10 CYSTOCELE, UNSPECIFIED: ICD-10-CM

## 2023-05-25 DIAGNOSIS — Z46.89 ENCOUNTER FOR FITTING AND ADJUSTMENT OF OTHER SPECIFIED DEVICES: ICD-10-CM

## 2023-05-30 ENCOUNTER — OUTPATIENT (OUTPATIENT)
Dept: OUTPATIENT SERVICES | Facility: HOSPITAL | Age: 63
LOS: 1 days | End: 2023-05-30
Payer: COMMERCIAL

## 2023-05-30 ENCOUNTER — APPOINTMENT (OUTPATIENT)
Dept: UROLOGY | Facility: CLINIC | Age: 63
End: 2023-05-30
Payer: COMMERCIAL

## 2023-05-30 VITALS
HEART RATE: 70 BPM | SYSTOLIC BLOOD PRESSURE: 138 MMHG | DIASTOLIC BLOOD PRESSURE: 84 MMHG | HEIGHT: 65 IN | WEIGHT: 173 LBS | RESPIRATION RATE: 17 BRPM | BODY MASS INDEX: 28.82 KG/M2

## 2023-05-30 DIAGNOSIS — Z98.891 HISTORY OF UTERINE SCAR FROM PREVIOUS SURGERY: Chronic | ICD-10-CM

## 2023-05-30 DIAGNOSIS — Z90.49 ACQUIRED ABSENCE OF OTHER SPECIFIED PARTS OF DIGESTIVE TRACT: Chronic | ICD-10-CM

## 2023-05-30 DIAGNOSIS — Z46.89 ENCOUNTER FOR FITTING AND ADJUSTMENT OF OTHER SPECIFIED DEVICES: ICD-10-CM

## 2023-05-30 DIAGNOSIS — N81.10 CYSTOCELE, UNSPECIFIED: ICD-10-CM

## 2023-05-30 PROCEDURE — 57160 INSERT PESSARY/OTHER DEVICE: CPT

## 2023-05-30 NOTE — ASSESSMENT
[FreeTextEntry1] : pessary with support #2 inserted. pt tolerated well. \par pt was kept in the office for about 15mins, instructed to perform usual daily movements such has walking, jogging and squatting to test if the pessary would cause any discomfort/pain.\par \par pt verbalized comfortable with the pessary #2. willing to go home with the size\par will call office with questions and concerns. \par \par RTO 1 month

## 2023-05-30 NOTE — HISTORY OF PRESENT ILLNESS
[FreeTextEntry1] : 63 yr old female patient with hx of prolapse, fitted with pessary with support #3 on 5/23/23, however, pessary fell out that night, so pt was added on today to be fitted with pessary with support #2. \par \par

## 2023-06-01 DIAGNOSIS — N81.10 CYSTOCELE, UNSPECIFIED: ICD-10-CM

## 2023-06-01 DIAGNOSIS — Z46.89 ENCOUNTER FOR FITTING AND ADJUSTMENT OF OTHER SPECIFIED DEVICES: ICD-10-CM

## 2024-01-01 NOTE — ASSESSMENT
[FreeTextEntry1] : 62 yo female with cystocele with prolapse. \par \par PVR today is 40 mL\par \par Discussed conservative as well as surgical options for management of cystocele with prolapse\par Patient would like to proceed with pessary hard copy, drawn during this pregnancy

## 2024-03-27 NOTE — H&P PST ADULT - NSICDXPASTMEDICALHX_GEN_ALL_CORE_FT
Pt called, has an abscess tooth, would like abx please. WM Cruz   PAST MEDICAL HISTORY:  2019 novel coronavirus disease (COVID-19) - 4 days in the hospital, 1 day on O2 (pneumonia), 03/2021, no residuals    Asthma - mild, no hospitalizations    Distal radius fracture, left 10/30/21    MVP (mitral valve prolapse) - mild

## 2024-06-19 ENCOUNTER — APPOINTMENT (OUTPATIENT)
Dept: OTOLARYNGOLOGY | Facility: CLINIC | Age: 64
End: 2024-06-19

## 2024-06-19 VITALS
DIASTOLIC BLOOD PRESSURE: 86 MMHG | WEIGHT: 170 LBS | HEIGHT: 65 IN | SYSTOLIC BLOOD PRESSURE: 125 MMHG | BODY MASS INDEX: 28.32 KG/M2 | HEART RATE: 67 BPM

## 2024-06-19 PROCEDURE — 31231 NASAL ENDOSCOPY DX: CPT

## 2024-06-19 PROCEDURE — 99203 OFFICE O/P NEW LOW 30 MIN: CPT | Mod: 25

## 2024-06-19 RX ORDER — MELOXICAM 7.5 MG/1
7.5 TABLET ORAL
Qty: 20 | Refills: 0 | Status: COMPLETED | COMMUNITY
Start: 2021-10-12 | End: 2024-06-19

## 2024-06-19 RX ORDER — DOXYCYCLINE HYCLATE 100 MG/1
100 TABLET ORAL
Qty: 20 | Refills: 0 | Status: COMPLETED | COMMUNITY
Start: 2021-08-27 | End: 2024-06-19

## 2024-06-19 RX ORDER — OXYCODONE 5 MG/1
5 TABLET ORAL
Qty: 15 | Refills: 0 | Status: COMPLETED | COMMUNITY
Start: 2021-12-01 | End: 2024-06-19

## 2024-06-19 RX ORDER — FLUTICASONE PROPIONATE 110 UG/1
110 AEROSOL, METERED RESPIRATORY (INHALATION)
Qty: 36 | Refills: 0 | Status: COMPLETED | COMMUNITY
Start: 2021-06-07 | End: 2024-06-19

## 2024-06-19 RX ORDER — IBUPROFEN 800 MG/1
800 TABLET, FILM COATED ORAL EVERY 8 HOURS
Qty: 90 | Refills: 3 | Status: COMPLETED | COMMUNITY
Start: 2021-12-01 | End: 2024-06-19

## 2024-06-19 RX ORDER — PREDNISONE 10 MG/1
10 TABLET ORAL
Qty: 10 | Refills: 0 | Status: COMPLETED | COMMUNITY
Start: 2021-08-23 | End: 2024-06-19

## 2024-06-19 RX ORDER — AMOXICILLIN 875 MG/1
875 TABLET, FILM COATED ORAL
Qty: 20 | Refills: 0 | Status: COMPLETED | COMMUNITY
Start: 2020-11-25 | End: 2024-06-19

## 2024-06-19 RX ORDER — ACETAMINOPHEN 500 MG/1
500 TABLET, COATED ORAL
Qty: 180 | Refills: 3 | Status: COMPLETED | COMMUNITY
Start: 2021-12-01 | End: 2024-06-19

## 2024-06-19 RX ORDER — KETOROLAC TROMETHAMINE 10 MG/1
10 TABLET, FILM COATED ORAL
Qty: 15 | Refills: 0 | Status: COMPLETED | COMMUNITY
Start: 2021-10-30 | End: 2024-06-19

## 2024-06-19 RX ORDER — PREDNISONE 5 MG/1
5 TABLET ORAL
Qty: 120 | Refills: 0 | Status: COMPLETED | COMMUNITY
Start: 2021-08-05 | End: 2024-06-19

## 2024-06-19 RX ORDER — KETOROLAC TROMETHAMINE 10 MG/1
10 TABLET, FILM COATED ORAL
Qty: 15 | Refills: 0 | Status: COMPLETED | COMMUNITY
Start: 2021-12-03 | End: 2024-06-19

## 2024-06-19 NOTE — ASSESSMENT
[FreeTextEntry1] : 64 year F present with nasal congestion/PND   Nasal endoscopy shows Right DNS, Moderate Inferior Turbinates Hypertrophy, No polyps, purulence or masses, Posterior Nasal pharynx Cobblestone/Clear Drainage, Moderate mucus production coating nasal cavity   Nasal Congestion / PND -Discussed the importance of rinsing the sinus before using nasal spray so that excess mucus lining the nasal cavity can be wash away so medication can penetration the nose. -Send to pharmacy Flonase nasal spray to be used after completing daily Sinus Rinse -If normal saline sinus rinse + nasal spray is not effective can discuss medicated nasal rinses and imaging for additional evaluation  Nasal septal deviation -Discussed deviated septum occurs when the thin wall between your nasal passages is displaced to one side.When the nasal septum is off-center it makes one nasal passage smaller. -If a deviated septum is severe, it can block one side of the nose and reduce airflow, causing difficulty breathing. -The exposure of a deviated septum to the drying effect of airflow through the nose may sometimes contribute to crusting or bleeding in certain people. -Treatment of nasal obstruction includes nasal steroids to reduce the swelling. -If conservative medical management is not effective correction of the deviated septum through surgery may be needed.  Laryngopharyngeal Reflux - Discussed Lifestyle changes as the most effective methods to improvement LPR. Weight Loss if overweight. Avoid foods that increase the level of acid in your stomach, including caffeinated/carbonated drinks.  - Avoid foods that decrease the pressure in the lower esophagus, such as fatty foods, alcohol and peppermint. - Avoid large meals.Try to have an empty stomach 2 hours before going to bed. If still smoking please Quit. - Head of bed elevation when you lying down -LPR Handout Given - Discussed Sodium alginate as mechanical plug for preventing reflux - Discussed with Patient if they have not seen Gastroenterolgy in the past for endoscopy they should follow up as chronic reflux can causes mucosal changes in the lining of the lower esophagus  - Lifestyle change  -Return to clinic in 4 months or sooner if new/worsen symptoms present

## 2024-06-19 NOTE — HISTORY OF PRESENT ILLNESS
[de-identified] : 64 year old female presents for evaluation of left throat and ear pain. Reports intermittent left throat and left ear pain which began 3 weeks ago. Denies fever, otorrhea, rhinorrhea, changes with hearing and dysphagia. No associated facial pain or swelling.  Occasional nasal congestion. Sense of smell "comes and goes" since having COVID in December. History of Asthma - on Symbicort BID. History of acid reflux. No current medications. States she tries to watch her diet to avoid reflux symptoms.

## 2024-07-08 PROBLEM — R09.82 PND (POST-NASAL DRIP): Status: ACTIVE | Noted: 2024-07-08

## 2024-07-08 PROBLEM — J39.2 THROAT IRRITATION: Status: ACTIVE | Noted: 2024-07-08

## 2024-07-08 PROBLEM — K21.9 LPRD (LARYNGOPHARYNGEAL REFLUX DISEASE): Status: ACTIVE | Noted: 2024-07-08

## 2024-07-08 PROBLEM — J34.2 NASAL SEPTAL DEVIATION: Status: ACTIVE | Noted: 2024-07-08

## 2024-07-08 PROBLEM — R09.81 NASAL CONGESTION: Status: ACTIVE | Noted: 2024-07-08

## 2024-09-27 ENCOUNTER — APPOINTMENT (OUTPATIENT)
Dept: OTOLARYNGOLOGY | Facility: CLINIC | Age: 64
End: 2024-09-27